# Patient Record
Sex: FEMALE | Race: WHITE | NOT HISPANIC OR LATINO | URBAN - METROPOLITAN AREA
[De-identification: names, ages, dates, MRNs, and addresses within clinical notes are randomized per-mention and may not be internally consistent; named-entity substitution may affect disease eponyms.]

---

## 2023-03-13 ENCOUNTER — OUTPATIENT (OUTPATIENT)
Dept: OUTPATIENT SERVICES | Facility: HOSPITAL | Age: 38
LOS: 1 days | End: 2023-03-13
Payer: COMMERCIAL

## 2023-03-13 DIAGNOSIS — Z11.52 ENCOUNTER FOR SCREENING FOR COVID-19: ICD-10-CM

## 2023-03-13 LAB — SARS-COV-2 RNA SPEC QL NAA+PROBE: SIGNIFICANT CHANGE UP

## 2023-03-13 PROCEDURE — C9803: CPT

## 2023-03-13 PROCEDURE — U0003: CPT

## 2023-03-13 PROCEDURE — U0005: CPT

## 2023-03-15 ENCOUNTER — INPATIENT (INPATIENT)
Facility: HOSPITAL | Age: 38
LOS: 2 days | Discharge: HOME | End: 2023-03-18
Attending: OBSTETRICS & GYNECOLOGY | Admitting: OBSTETRICS & GYNECOLOGY
Payer: COMMERCIAL

## 2023-03-15 VITALS
RESPIRATION RATE: 17 BRPM | TEMPERATURE: 98 F | HEART RATE: 88 BPM | SYSTOLIC BLOOD PRESSURE: 122 MMHG | DIASTOLIC BLOOD PRESSURE: 78 MMHG

## 2023-03-15 DIAGNOSIS — O48.0 POST-TERM PREGNANCY: ICD-10-CM

## 2023-03-15 LAB
BASOPHILS # BLD AUTO: 0.02 K/UL — SIGNIFICANT CHANGE UP (ref 0–0.2)
BASOPHILS NFR BLD AUTO: 0.2 % — SIGNIFICANT CHANGE UP (ref 0–2)
BLD GP AB SCN SERPL QL: NEGATIVE — SIGNIFICANT CHANGE UP
EOSINOPHIL # BLD AUTO: 0.08 K/UL — SIGNIFICANT CHANGE UP (ref 0–0.5)
EOSINOPHIL NFR BLD AUTO: 0.8 % — SIGNIFICANT CHANGE UP (ref 0–6)
HCT VFR BLD CALC: 36.4 % — SIGNIFICANT CHANGE UP (ref 34.5–45)
HGB BLD-MCNC: 11.9 G/DL — SIGNIFICANT CHANGE UP (ref 11.5–15.5)
IMM GRANULOCYTES NFR BLD AUTO: 0.6 % — SIGNIFICANT CHANGE UP (ref 0–0.9)
LYMPHOCYTES # BLD AUTO: 1.49 K/UL — SIGNIFICANT CHANGE UP (ref 1–3.3)
LYMPHOCYTES # BLD AUTO: 15.5 % — SIGNIFICANT CHANGE UP (ref 13–44)
MCHC RBC-ENTMCNC: 30.8 PG — SIGNIFICANT CHANGE UP (ref 27–34)
MCHC RBC-ENTMCNC: 32.7 GM/DL — SIGNIFICANT CHANGE UP (ref 32–36)
MCV RBC AUTO: 94.3 FL — SIGNIFICANT CHANGE UP (ref 80–100)
MONOCYTES # BLD AUTO: 0.52 K/UL — SIGNIFICANT CHANGE UP (ref 0–0.9)
MONOCYTES NFR BLD AUTO: 5.4 % — SIGNIFICANT CHANGE UP (ref 2–14)
NEUTROPHILS # BLD AUTO: 7.42 K/UL — HIGH (ref 1.8–7.4)
NEUTROPHILS NFR BLD AUTO: 77.5 % — HIGH (ref 43–77)
NRBC # BLD: 0 /100 WBCS — SIGNIFICANT CHANGE UP (ref 0–0)
PLATELET # BLD AUTO: 264 K/UL — SIGNIFICANT CHANGE UP (ref 150–400)
RBC # BLD: 3.86 M/UL — SIGNIFICANT CHANGE UP (ref 3.8–5.2)
RBC # FLD: 12.8 % — SIGNIFICANT CHANGE UP (ref 10.3–14.5)
RH IG SCN BLD-IMP: POSITIVE — SIGNIFICANT CHANGE UP
WBC # BLD: 9.59 K/UL — SIGNIFICANT CHANGE UP (ref 3.8–10.5)
WBC # FLD AUTO: 9.59 K/UL — SIGNIFICANT CHANGE UP (ref 3.8–10.5)

## 2023-03-15 RX ORDER — SODIUM CHLORIDE 9 MG/ML
1000 INJECTION, SOLUTION INTRAVENOUS
Refills: 0 | Status: DISCONTINUED | OUTPATIENT
Start: 2023-03-15 | End: 2023-03-16

## 2023-03-15 RX ORDER — CHLORHEXIDINE GLUCONATE 213 G/1000ML
1 SOLUTION TOPICAL ONCE
Refills: 0 | Status: DISCONTINUED | OUTPATIENT
Start: 2023-03-15 | End: 2023-03-16

## 2023-03-15 RX ORDER — OXYTOCIN 10 UNIT/ML
333.33 VIAL (ML) INJECTION
Qty: 20 | Refills: 0 | Status: DISCONTINUED | OUTPATIENT
Start: 2023-03-15 | End: 2023-03-16

## 2023-03-15 RX ORDER — CITRIC ACID/SODIUM CITRATE 300-500 MG
15 SOLUTION, ORAL ORAL EVERY 6 HOURS
Refills: 0 | Status: DISCONTINUED | OUTPATIENT
Start: 2023-03-15 | End: 2023-03-16

## 2023-03-15 RX ADMIN — SODIUM CHLORIDE 125 MILLILITER(S): 9 INJECTION, SOLUTION INTRAVENOUS at 21:53

## 2023-03-15 NOTE — OB PROVIDER H&P - ASSESSMENT
A/P: 37yoF  @40.6 weeks gestation ([facility] 3/9/23) admitted for late term IOL. GBS negative. PNC uncomplicated.   - Admit to L&D  - Routine Labs. IVF. Covid negative   - EFM/[place]: continuous monitoring   - IOL with PO Cytotec & Cervical balloon at 12a  - GBS negative   - Elevated PPH risk 2/2 AMA on ASA  - Anesthesia consult - epidural prn   - D/w Dr. Jannette Argueta PA-C

## 2023-03-15 NOTE — OB PROVIDER H&P - HISTORY OF PRESENT ILLNESS
OB PA Admission H&P    37yoF  @40.6 weeks gestation ([facility] 3/9/23) presents for scheduled late term IOL. Endorses +FM. Denies -LOF. -CTXs. -VB. GBS negative. PNC uncomplicated. Pt denies any other concerns.    – PNC: GBS negative. EFW 3600g.   – OBHx: IVF pregnancy   – GynHx: denies h/o fibroids, ovarian cysts, abnl pap smears, STDs  – PMH: celiac dz; denies h/o HTN, DM, asthma, thyroid disorders, bleeding disorders, h/o blood transfusions   – PSH: h/o egg retrieval   – Psych: denies anxiety/depression   – Social: denies alcohol/tobacco/drug use in pregnancy   – Meds: PNV, ASA, Vit B6   – Allergies: PCN & Cipro (rash and hives)   – Will accept blood transfusions: Yes. Blood type O+    Vital Signs Last 24 Hrs  T(C): 36.5 (15 Mar 2023 21:54), Max: 36.6 (15 Mar 2023 20:33)  T(F): 97.7 (15 Mar 2023 21:54), Max: 97.9 (15 Mar 2023 20:36)  HR: 92 (15 Mar 2023 22:10) (86 - 116)  BP: 115/76 (15 Mar 2023 21:54) (115/76 - 122/78)  RR: 17 (15 Mar 2023 21:54) (17 - 17)  SpO2: 98% (15 Mar 2023 22:10) (96% - 100%)    Gen: NAD  CV: RRR  Lungs: CTA b/l   Abd: gravid, non-tender  Ext: BLE non-edematous, no calf tenderness b/l     – VE: 1/50/-3  – FHT: baseline 140, mod variability, +accels, -decels  – [place]: irregular   – EFW: 3600g   – [place]: vertex

## 2023-03-15 NOTE — OB RN PATIENT PROFILE - FALL HARM RISK - UNIVERSAL INTERVENTIONS
Bed in lowest position, wheels locked, appropriate side rails in place/Call bell, personal items and telephone in reach/Instruct patient to call for assistance before getting out of bed or chair/Non-slip footwear when patient is out of bed/[place] to call system/Physically safe environment - no spills, clutter or unnecessary equipment/Purposeful Proactive Rounding/Room/bathroom lighting operational, light cord in reach

## 2023-03-16 LAB
COVID-19 SPIKE DOMAIN AB INTERP: POSITIVE
COVID-19 SPIKE DOMAIN ANTIBODY RESULT: >250 U/ML — HIGH
SARS-COV-2 IGG+IGM SERPL QL IA: >250 U/ML — HIGH
SARS-COV-2 IGG+IGM SERPL QL IA: POSITIVE
T PALLIDUM AB TITR SER: NEGATIVE — SIGNIFICANT CHANGE UP

## 2023-03-16 RX ORDER — AER TRAVELER 0.5 G/1
1 SOLUTION RECTAL; TOPICAL EVERY 4 HOURS
Refills: 0 | Status: DISCONTINUED | OUTPATIENT
Start: 2023-03-16 | End: 2023-03-18

## 2023-03-16 RX ORDER — KETOROLAC TROMETHAMINE 30 MG/ML
30 SYRINGE (ML) INJECTION ONCE
Refills: 0 | Status: DISCONTINUED | OUTPATIENT
Start: 2023-03-16 | End: 2023-03-16

## 2023-03-16 RX ORDER — SIMETHICONE 80 MG/1
80 TABLET, CHEWABLE ORAL EVERY 4 HOURS
Refills: 0 | Status: DISCONTINUED | OUTPATIENT
Start: 2023-03-16 | End: 2023-03-18

## 2023-03-16 RX ORDER — OXYTOCIN 10 UNIT/ML
4 VIAL (ML) INJECTION
Qty: 30 | Refills: 0 | Status: DISCONTINUED | OUTPATIENT
Start: 2023-03-16 | End: 2023-03-16

## 2023-03-16 RX ORDER — ACETAMINOPHEN 500 MG
975 TABLET ORAL
Refills: 0 | Status: DISCONTINUED | OUTPATIENT
Start: 2023-03-16 | End: 2023-03-18

## 2023-03-16 RX ORDER — OXYTOCIN 10 UNIT/ML
41.67 VIAL (ML) INJECTION
Qty: 20 | Refills: 0 | Status: DISCONTINUED | OUTPATIENT
Start: 2023-03-16 | End: 2023-03-18

## 2023-03-16 RX ORDER — TETANUS TOXOID, REDUCED DIPHTHERIA TOXOID AND ACELLULAR PERTUSSIS VACCINE, ADSORBED 5; 2.5; 8; 8; 2.5 [IU]/.5ML; [IU]/.5ML; UG/.5ML; UG/.5ML; UG/.5ML
0.5 SUSPENSION INTRAMUSCULAR ONCE
Refills: 0 | Status: DISCONTINUED | OUTPATIENT
Start: 2023-03-16 | End: 2023-03-18

## 2023-03-16 RX ORDER — IBUPROFEN 200 MG
600 TABLET ORAL EVERY 6 HOURS
Refills: 0 | Status: COMPLETED | OUTPATIENT
Start: 2023-03-16 | End: 2024-02-12

## 2023-03-16 RX ORDER — MAGNESIUM HYDROXIDE 400 MG/1
30 TABLET, CHEWABLE ORAL
Refills: 0 | Status: DISCONTINUED | OUTPATIENT
Start: 2023-03-16 | End: 2023-03-18

## 2023-03-16 RX ORDER — LANOLIN
1 OINTMENT (GRAM) TOPICAL EVERY 6 HOURS
Refills: 0 | Status: DISCONTINUED | OUTPATIENT
Start: 2023-03-16 | End: 2023-03-18

## 2023-03-16 RX ORDER — DIBUCAINE 1 %
1 OINTMENT (GRAM) RECTAL EVERY 6 HOURS
Refills: 0 | Status: DISCONTINUED | OUTPATIENT
Start: 2023-03-16 | End: 2023-03-18

## 2023-03-16 RX ORDER — DIPHENHYDRAMINE HCL 50 MG
25 CAPSULE ORAL ONCE
Refills: 0 | Status: COMPLETED | OUTPATIENT
Start: 2023-03-16 | End: 2023-03-16

## 2023-03-16 RX ORDER — GENTAMICIN SULFATE 40 MG/ML
400 VIAL (ML) INJECTION ONCE
Refills: 0 | Status: COMPLETED | OUTPATIENT
Start: 2023-03-16 | End: 2023-03-16

## 2023-03-16 RX ORDER — HYDROCORTISONE 1 %
1 OINTMENT (GRAM) TOPICAL EVERY 6 HOURS
Refills: 0 | Status: DISCONTINUED | OUTPATIENT
Start: 2023-03-16 | End: 2023-03-18

## 2023-03-16 RX ORDER — OXYCODONE HYDROCHLORIDE 5 MG/1
5 TABLET ORAL
Refills: 0 | Status: DISCONTINUED | OUTPATIENT
Start: 2023-03-16 | End: 2023-03-18

## 2023-03-16 RX ORDER — BENZOCAINE 10 %
1 GEL (GRAM) MUCOUS MEMBRANE EVERY 6 HOURS
Refills: 0 | Status: DISCONTINUED | OUTPATIENT
Start: 2023-03-16 | End: 2023-03-18

## 2023-03-16 RX ORDER — OXYCODONE HYDROCHLORIDE 5 MG/1
5 TABLET ORAL ONCE
Refills: 0 | Status: DISCONTINUED | OUTPATIENT
Start: 2023-03-16 | End: 2023-03-18

## 2023-03-16 RX ORDER — PRAMOXINE HYDROCHLORIDE 150 MG/15G
1 AEROSOL, FOAM RECTAL EVERY 4 HOURS
Refills: 0 | Status: DISCONTINUED | OUTPATIENT
Start: 2023-03-16 | End: 2023-03-18

## 2023-03-16 RX ORDER — SODIUM CHLORIDE 9 MG/ML
3 INJECTION INTRAMUSCULAR; INTRAVENOUS; SUBCUTANEOUS EVERY 8 HOURS
Refills: 0 | Status: DISCONTINUED | OUTPATIENT
Start: 2023-03-16 | End: 2023-03-18

## 2023-03-16 RX ORDER — ACETAMINOPHEN 500 MG
975 TABLET ORAL ONCE
Refills: 0 | Status: COMPLETED | OUTPATIENT
Start: 2023-03-16 | End: 2023-03-16

## 2023-03-16 RX ORDER — DIPHENHYDRAMINE HCL 50 MG
25 CAPSULE ORAL EVERY 6 HOURS
Refills: 0 | Status: DISCONTINUED | OUTPATIENT
Start: 2023-03-16 | End: 2023-03-18

## 2023-03-16 RX ADMIN — Medication 125 MILLIUNIT(S)/MIN: at 23:03

## 2023-03-16 RX ADMIN — Medication 520 MILLIGRAM(S): at 20:12

## 2023-03-16 RX ADMIN — Medication 30 MILLIGRAM(S): at 22:51

## 2023-03-16 RX ADMIN — Medication 4 MILLIUNIT(S)/MIN: at 08:21

## 2023-03-16 RX ADMIN — Medication 25 MILLIGRAM(S): at 16:45

## 2023-03-16 RX ADMIN — Medication 975 MILLIGRAM(S): at 19:38

## 2023-03-16 RX ADMIN — Medication 100 MILLIGRAM(S): at 19:34

## 2023-03-16 NOTE — PRE-ANESTHESIA EVALUATION ADULT - NSANTHADDINFOFT_GEN_ALL_CORE
Risks, benefits, and alternatives to neuraxial anesthesia discussed with patient.  Risks including but not limited to infection, bleeding including spinal or epidural hematoma, headache, post dural puncture headache, and nerve injury discussed with pateint.  They understand risks, and wish to proceed with neuraxial anesthesia.

## 2023-03-16 NOTE — OB PROVIDER DELIVERY SUMMARY - NSPROVIDERDELIVERYNOTE_OBGYN_ALL_OB_FT
Male infant delivered in OA position after midline episiotomy. Nuchal x1 was noted. Nuchal cord was easily reduced after delivery of the body.    Infant passed to the mother. Cord clamping was delayed for 30 sec. Infant handed to pediatrics at the bedside given category II tracing. Cord gasses obtained via a segment of cord. APGAR 2 and 7. Infant to NICU.     Placenta was delivered spontaneously intact. Uterine massage was performed and Oxytocin was given upon delivery of the placenta. Fundus noted to be firm. Cervix and rectum intact.    Midline episiotomy repaired with 2-0 and 3-0 vicryl rapide.     Adequate hemostasis. QBL: 300  Count correct x2.

## 2023-03-16 NOTE — CHART NOTE - NSCHARTNOTEFT_GEN_A_CORE
pt reassessed due to increased pressure  cx 5/80/-1 but cx swollen by resident exam  minimal change despite ctxs q 2min on pitocin since 8am, currently at 16  IUPC placed in order to assess adequacy of ctxs w MVU and increase as needed for cervical change  benadryl to be administered as well  d/w pt and partner

## 2023-03-16 NOTE — OB RN DELIVERY SUMMARY - NS_SEPSISRSKCALC_OBGYN_ALL_OB_FT
EOS calculated successfully. EOS Risk Factor: 0.5/1000 live births ([facility] national incidence); GA=41w;Temp=101.12; ROM=8.783; GBS='Negative'; Antibiotics='No antibiotics or any antibiotics < 2 hrs prior to birth'

## 2023-03-16 NOTE — OB PROVIDER LABOR PROGRESS NOTE - NS_SUBJECTIVE/OBJECTIVE_OBGYN_ALL_OB_FT
Pt seen for increased pressure, now febrile to 38.4    VE: 6/80/-1  EFM: 150/moderate variability/+accels/-decels  TOCO: q2mins    A/P  - right lateral positioning   - Gent/[place]/Tylenol  - cw pit     gary Corea PAC
R1 Labor & Delivery Progress Note     Pt seen & examined at bedside for labor progression.     T(C): 36.5 (23 @ 09:01), Max: 36.6 (03-15-23 @ 20:33)  HR: 91 (23 @ 11:07) (61 - 116)  BP: 135/86 (23 @ 10:59) (98/59 - 135/86)  RR: 18 (23 @ 09:01) (15 - 18)  SpO2: 97% (23 @ 11:07) (93% - 100%)    Plan: 37y y/o  @41w in stable condition. Fetal head more engaged to at the right cervix. LLD position w/ peanut ball.   - Con't IOL  - Continuous EFM, [place]  - Con't IVF    Janina Carroll  PGY-1
R1 Labor & Delivery Progress Note     Pt seen & examined at bedside for labor progression.    T(C): 36.6 (23 @ 05:53), Max: 36.6 (03-15-23 @ 20:33)  HR: 98 (23 @ 07:59) (61 - 116)  BP: 110/76 (23 @ 07:59) (98/59 - 122/78)  RR: 15 (23 @ 05:53) (15 - 18)  SpO2: 98% (23 @ 07:57) (94% - 100%)    Plan: 37y y/o  @41w in stable condition examined s/p CB expulsion w/ gentle traction.   - Con't IOL  - Continuous EFM, [place]  - Con't IVF    d/w attending physician Dr. Aleena Carroll  PGY-1
early decels noted, mod sammy, pt feeling pressure w ctxs  cx now 4/80/-2, soft  AROM litte fluid, NORRIS 6-7 last sono  pt repositioned, cont pitocin  calluzzo
mod sammy w late decels  ve 8.5/90/-1, very soft and reducible  discussed options w pt  will observe closely and reexamine shortly as now in active labor making cervical change  if persistent cat 2 w/o progress will proceed w c/s  yovani
Patient seen for routine CE.
Pt seen for placement of cervical balloon  CB placed 60 cc NS in uterine, 60 in vaginal.  Pt tolerated well.  Pt remains comfortable without epidural for pain mgmt.     VE: 1.5/50/-3    Vital Signs Last 24 Hrs  T(C): 36.5 (15 Mar 2023 21:54), Max: 36.6 (15 Mar 2023 20:33)  T(F): 97.7 (15 Mar 2023 21:54), Max: 97.9 (15 Mar 2023 20:36)  HR: 78 (15 Mar 2023 23:50) (78 - 116)  BP: 121/88 (15 Mar 2023 23:50) (115/76 - 122/78)  RR: 17 (15 Mar 2023 21:54) (17 - 17)  SpO2: 98% (15 Mar 2023 22:15) (96% - 100%)

## 2023-03-16 NOTE — OB NEONATOLOGY/PEDIATRICIAN DELIVERY SUMMARY - NSPEDSNEONOTESA_OBGYN_ALL_OB_FT
Requested to attend delivery for cat II tracings of 41 wk Male born via  on 23 @  to a 35 y/o  mother. Maternal history of Celiac disease. IVF pregnancy with donor sperm. Maternal labs include Blood Type O+, HIV -, RPR NR, Rubella I, Hep B -, GBS - on 23, COVID - on 3/13/23. AROM at 1212 with clear fluids (ROM: 7 hours). Baby had NC x1 and emerged limp with no cry. Deep suctioned and stimulated with minimal response. Code 100 called. PPV initiated @ 1 min 30 sec of life x 15 seconds. NICU team arrived and CPAP initiated around 2 MOL with weak cry noted. Max CPAP settings of 6/80%. APGARS of 2/7. Transferred to NICU for respiratory distress. Parents updated.   Mom plans to initiate breastfeeding, consents Hep B vaccine, and consents circumcision. Highest maternal temp: 38.4 C (treated with clindamycin and gentamicin < 2 hours PTD). EOS 1.28.

## 2023-03-16 NOTE — OB PROVIDER DELIVERY SUMMARY - NSLOWPPHRISK_OBGYN_A_OB
No previous uterine incision/[place] Pregnancy/Less than or equal to 4 previous vaginal births/No known bleeding disorder/No history of postpartum hemorrhage

## 2023-03-16 NOTE — OB RN DELIVERY SUMMARY - NS_LABORCHARACTER_OBGYN_ALL_OB
Induction of labor-AROM/Induction of labor-Medicinal/Febrile (>38C)/External electronic FM/Antibiotics in labor/Fetal intolerance/Chorioamnionitis

## 2023-03-16 NOTE — OB RN DELIVERY SUMMARY - NSSELHIDDEN_OBGYN_ALL_OB_FT
[NS_DeliveryAttending1_OBGYN_ALL_OB_FT:[id number]==],[NS_DeliveryAssist1_OBGYN_ALL_OB_FT:[id number]],[NS_DeliveryRN_OBGYN_ALL_OB_FT:[id number]=]

## 2023-03-16 NOTE — OB PROVIDER LABOR PROGRESS NOTE - ASSESSMENT
A/P: 37yoF  @40.6 weeks gestation ([facility] 3/9/23) admitted for late term IOL. GBS negative. PNC uncomplicated.   - IOL with PO Cytotec & Cervical balloon   - EFM/[place]: continuous monitoring   - GBS negative   - Anesthesia consult - epidural prn   - D/w Dr. Jannette Argueta, PAKathleenC  
-CE as indicted  -IUPC placed  -Benadryl ordered  -FHT monitoring    Alfonzo Linares PGY 1
pt seen by me  management of labor reviewed  consent obtained  reassuring fetal status  on pitocin  comfortable w epi  anticipate   yovani

## 2023-03-16 NOTE — OB PROVIDER DELIVERY SUMMARY - NSSELHIDDEN_OBGYN_ALL_OB_FT
[NS_DeliveryAttending1_OBGYN_ALL_OB_FT:[id number]==],[NS_DeliveryAssist1_OBGYN_ALL_OB_FT:[id number]]

## 2023-03-17 RX ORDER — IBUPROFEN 200 MG
600 TABLET ORAL EVERY 6 HOURS
Refills: 0 | Status: DISCONTINUED | OUTPATIENT
Start: 2023-03-17 | End: 2023-03-18

## 2023-03-17 RX ADMIN — Medication 100 MILLIGRAM(S): at 14:31

## 2023-03-17 RX ADMIN — Medication 600 MILLIGRAM(S): at 12:44

## 2023-03-17 RX ADMIN — Medication 975 MILLIGRAM(S): at 09:14

## 2023-03-17 RX ADMIN — Medication 100 MILLIGRAM(S): at 06:28

## 2023-03-17 RX ADMIN — Medication 975 MILLIGRAM(S): at 10:10

## 2023-03-17 RX ADMIN — Medication 975 MILLIGRAM(S): at 16:25

## 2023-03-17 RX ADMIN — SODIUM CHLORIDE 3 MILLILITER(S): 9 INJECTION INTRAMUSCULAR; INTRAVENOUS; SUBCUTANEOUS at 14:31

## 2023-03-17 RX ADMIN — Medication 975 MILLIGRAM(S): at 03:47

## 2023-03-17 RX ADMIN — Medication 600 MILLIGRAM(S): at 06:36

## 2023-03-17 RX ADMIN — Medication 975 MILLIGRAM(S): at 15:30

## 2023-03-17 RX ADMIN — Medication 600 MILLIGRAM(S): at 13:40

## 2023-03-17 RX ADMIN — Medication 975 MILLIGRAM(S): at 21:13

## 2023-03-17 RX ADMIN — Medication 600 MILLIGRAM(S): at 18:32

## 2023-03-17 RX ADMIN — Medication 100 MILLIGRAM(S): at 21:12

## 2023-03-17 RX ADMIN — Medication 975 MILLIGRAM(S): at 03:11

## 2023-03-17 RX ADMIN — Medication 975 MILLIGRAM(S): at 21:43

## 2023-03-17 RX ADMIN — Medication 1 TABLET(S): at 12:43

## 2023-03-17 NOTE — PROGRESS NOTE ADULT - SUBJECTIVE AND OBJECTIVE BOX
PPD#1- ATTENDING NOTE    S: Patient doing well. Minimal lochia. Pain controlled.    O: Vital Signs Last 24 Hrs  T(C): 36.4 (17 Mar 2023 05:53), Max: 38.4 (16 Mar 2023 19:01)  T(F): 97.6 (17 Mar 2023 05:53), Max: 101.12 (16 Mar 2023 19:01)  HR: 80 (17 Mar 2023 05:53) (75 - 174)  BP: 96/56 (17 Mar 2023 05:53) (93/62 - 133/82)  BP(mean): 81 (17 Mar 2023 00:03) (65 - 81)  RR: 18 (17 Mar 2023 05:53) (16 - 18)  SpO2: 98% (17 Mar 2023 05:53) (81% - 100%)    Parameters below as of 17 Mar 2023 05:53  Patient On (Oxygen Delivery Method): room air        Gen: NAD  Abd: soft, NT, ND, fundus firm below umbilicus  Lochia: moderate  Ext: no tenderness, no hyper reflexia,     Labs:                            11.9   9.59  )-----------( 264      ( 15 Mar 2023 21:37 )             36.4       A: 37y PPD#1 s/p  doing well.    Plan:  Analgesia prn  Regular diet  Follow up am labs  Routine post partum care

## 2023-03-17 NOTE — PROGRESS NOTE ADULT - ASSESSMENT
A/P: 37y   PPD # 1  S/P  doing well, meeting postpartum milestones  - cont routine postpartum care  - cont regular diet, PO Pain meds  - encourage OOB  - to be seen by covering AM attending     ANA LILIA Gatica

## 2023-03-17 NOTE — PROGRESS NOTE ADULT - SUBJECTIVE AND OBJECTIVE BOX
Postpartum Note- PPD#1    Allergies  Cipro (Hives; Rash)  penicillin (Rash)    Blood Type O   Positive    RPR : Negative      S:Patient is a  37y      P      PPD#1         S/P   /  VAVD  Patient w/o complaints, pain is controlled.    Pt is OOB, tolerating PO, passing flatus. Lochia WNL.     Feeding: Breast/Bottle    O:  Vital Signs Last 24 Hrs  T(C): 36.6 (17 Mar 2023 00:45), Max: 38.4 (16 Mar 2023 19:01)  T(F): 97.8 (17 Mar 2023 00:45), Max: 101.12 (16 Mar 2023 19:01)  HR: 95 (17 Mar 2023 00:45) (63 - 174)  BP: 100/65 (17 Mar 2023 00:45) (93/62 - 135/86)  BP(mean): 81 (17 Mar 2023 00:03) (65 - 81)  RR: 18 (17 Mar 2023 00:45) (15 - 18)  SpO2: 96% (17 Mar 2023 00:45) (81% - 100%)    Parameters below as of 17 Mar 2023 00:45  Patient On (Oxygen Delivery Method): room air         Gen: NAD  Abdomen: Soft, nontender, non-distended, fundus firm.  Vaginal: Lochia WNL  Ext: Neg calf tenderness    LABS:    Hemoglobin: 11.9 g/dL (03-15 @ 21:37)      Hematocrit: 36.4 % (03-15 @ 21:37)      A/P:  37y  G**P** PPD # 1      S/P                  / VAVD       doing well, meeting postpartum milestones      Increase OOB  Regular diet  PO Pain protocol  Routine Postpartum Care      ANA LILIA Gatica   Postpartum Note- PPD#1    Allergies  Cipro (Hives; Rash)  penicillin (Rash)    Blood Type O  Positive  RPR : Negative      S: Patient is a  37y     PPD#1  S/P    Patient w/o complaints, pain is well controlled.  +OOB, tolerating PO. Lochia WNL.     Feeding: Breast/Bottle**    O:  Vital Signs Last 24 Hrs  T(C): 36.6 (17 Mar 2023 00:45), Max: 38.4 (16 Mar 2023 19:01)  T(F): 97.8 (17 Mar 2023 00:45), Max: 101.12 (16 Mar 2023 19:01)  HR: 95 (17 Mar 2023 00:45) (63 - 174)  BP: 100/65 (17 Mar 2023 00:45) (93/62 - 135/86)  BP(mean): 81 (17 Mar 2023 00:03) (65 - 81)  RR: 18 (17 Mar 2023 00:45) (15 - 18)  SpO2: 96% (17 Mar 2023 00:45) (81% - 100%)     Gen: NAD  Abdomen: Soft, nontender, non-distended, fundus firm.  Vaginal: Lochia WNL  Ext: Neg calf tenderness    LABS:    Hemoglobin: 11.9 g/dL (03-15 @ 21:37)      Hematocrit: 36.4 % (03-15 @ 21:37)       Postpartum Note- PPD#1    Allergies  Cipro (Hives; Rash)  penicillin (Rash)    Blood Type O  Positive  RPR : Negative      S: Patient is a  37y     PPD#1  S/P    Patient w/o complaints, pain is well controlled.  +OOB, tolerating PO. Lochia WNL.     Feeding: Breast    O:  Vital Signs Last 24 Hrs  T(C): 36.6 (17 Mar 2023 00:45), Max: 38.4 (16 Mar 2023 19:01)  T(F): 97.8 (17 Mar 2023 00:45), Max: 101.12 (16 Mar 2023 19:01)  HR: 95 (17 Mar 2023 00:45) (63 - 174)  BP: 100/65 (17 Mar 2023 00:45) (93/62 - 135/86)  BP(mean): 81 (17 Mar 2023 00:03) (65 - 81)  RR: 18 (17 Mar 2023 00:45) (15 - 18)  SpO2: 96% (17 Mar 2023 00:45) (81% - 100%)     Gen: NAD  Abdomen: Soft, nontender, non-distended, fundus firm.  Vaginal: Lochia WNL  Ext: Neg calf tenderness    LABS:    Hemoglobin: 11.9 g/dL (03-15 @ 21:37)      Hematocrit: 36.4 % (03-15 @ 21:37)

## 2023-03-18 VITALS
DIASTOLIC BLOOD PRESSURE: 75 MMHG | HEART RATE: 76 BPM | OXYGEN SATURATION: 97 % | SYSTOLIC BLOOD PRESSURE: 121 MMHG | TEMPERATURE: 98 F | RESPIRATION RATE: 18 BRPM

## 2023-03-18 PROCEDURE — 86769 SARS-COV-2 COVID-19 ANTIBODY: CPT

## 2023-03-18 PROCEDURE — 86780 TREPONEMA PALLIDUM: CPT

## 2023-03-18 PROCEDURE — 36415 COLL VENOUS BLD VENIPUNCTURE: CPT

## 2023-03-18 PROCEDURE — 86901 BLOOD TYPING SEROLOGIC RH(D): CPT

## 2023-03-18 PROCEDURE — 59050 FETAL MONITOR W/REPORT: CPT

## 2023-03-18 PROCEDURE — 86850 RBC ANTIBODY SCREEN: CPT

## 2023-03-18 PROCEDURE — 86900 BLOOD TYPING SEROLOGIC ABO: CPT

## 2023-03-18 PROCEDURE — 85025 COMPLETE CBC W/AUTO DIFF WBC: CPT

## 2023-03-18 RX ORDER — IBUPROFEN 200 MG
1 TABLET ORAL
Qty: 0 | Refills: 0 | DISCHARGE
Start: 2023-03-18

## 2023-03-18 RX ADMIN — Medication 600 MILLIGRAM(S): at 01:29

## 2023-03-18 RX ADMIN — Medication 975 MILLIGRAM(S): at 03:35

## 2023-03-18 RX ADMIN — Medication 975 MILLIGRAM(S): at 04:06

## 2023-03-18 RX ADMIN — Medication 600 MILLIGRAM(S): at 11:32

## 2023-03-18 RX ADMIN — Medication 975 MILLIGRAM(S): at 08:43

## 2023-03-18 RX ADMIN — Medication 975 MILLIGRAM(S): at 09:38

## 2023-03-18 RX ADMIN — Medication 600 MILLIGRAM(S): at 06:29

## 2023-03-18 RX ADMIN — Medication 1 TABLET(S): at 11:32

## 2023-03-18 RX ADMIN — Medication 600 MILLIGRAM(S): at 12:25

## 2023-03-18 RX ADMIN — Medication 600 MILLIGRAM(S): at 00:54

## 2023-03-18 NOTE — DISCHARGE NOTE OB - NS MD DC FALL RISK RISK
For information on Fall & Injury Prevention, visit: [email or web address] OR  [email or web address] OR  https://www.cdc.gov/steadi/patient.html

## 2023-03-18 NOTE — DISCHARGE NOTE OB - CARE PLAN
Principal Discharge DX:	 (normal spontaneous vaginal delivery)  Assessment and plan of treatment:	reg diet   1 Principal Discharge DX:	 (normal spontaneous vaginal delivery)  Assessment and plan of treatment:	  Assessment and plan of treatment:	reg diet

## 2023-03-18 NOTE — DISCHARGE NOTE OB - CARE PROVIDER_API CALL
Alon Flood)  Obstetrics and Gynecology  [address], [place] [address]  Phone: [phone]  Fax: [phone]  Follow Up Time:

## 2023-03-18 NOTE — DISCHARGE NOTE OB - MATERIALS PROVIDED
[facility] [place] Screening Program/Breastfeeding Log/Breastfeeding Mother’s Support Group Information/Guide to Postpartum Care/[facility] Hearing Screen Program/Back To Sleep Handout/Shaken Baby Prevention Handout/Breastfeeding Guide and Packet/Birth Certificate Instructions

## 2023-03-18 NOTE — DISCHARGE NOTE OB - PATIENT PORTAL LINK FT
You can access the FollowMyHealth Patient Portal offered by [facility] by registering at the following website: [email or web address]. By joining [facility]’s FollowMyHealth portal, you will also be able to view your health information using other applications (apps) compatible with our system.

## 2023-03-18 NOTE — DISCHARGE NOTE OB - MEDICATION SUMMARY - MEDICATIONS TO TAKE
I will START or STAY ON the medications listed below when I get home from the hospital:    ibuprofen 600 mg oral tablet  -- 1 tab(s) by mouth every 6 hours  -- Indication: For Post term pregnancy
